# Patient Record
Sex: MALE | Race: WHITE | NOT HISPANIC OR LATINO
[De-identification: names, ages, dates, MRNs, and addresses within clinical notes are randomized per-mention and may not be internally consistent; named-entity substitution may affect disease eponyms.]

---

## 2023-05-23 ENCOUNTER — APPOINTMENT (OUTPATIENT)
Dept: ORTHOPEDIC SURGERY | Facility: CLINIC | Age: 63
End: 2023-05-23
Payer: COMMERCIAL

## 2023-05-23 VITALS — WEIGHT: 270 LBS | BODY MASS INDEX: 37.8 KG/M2 | HEIGHT: 71 IN

## 2023-05-23 DIAGNOSIS — Z78.9 OTHER SPECIFIED HEALTH STATUS: ICD-10-CM

## 2023-05-23 DIAGNOSIS — S83.241A OTHER TEAR OF MEDIAL MENISCUS, CURRENT INJURY, RIGHT KNEE, INITIAL ENCOUNTER: ICD-10-CM

## 2023-05-23 DIAGNOSIS — I10 ESSENTIAL (PRIMARY) HYPERTENSION: ICD-10-CM

## 2023-05-23 DIAGNOSIS — Z87.19 PERSONAL HISTORY OF OTHER DISEASES OF THE DIGESTIVE SYSTEM: ICD-10-CM

## 2023-05-23 PROBLEM — Z00.00 ENCOUNTER FOR PREVENTIVE HEALTH EXAMINATION: Status: ACTIVE | Noted: 2023-05-23

## 2023-05-23 PROCEDURE — 73564 X-RAY EXAM KNEE 4 OR MORE: CPT | Mod: 50

## 2023-05-23 PROCEDURE — 99204 OFFICE O/P NEW MOD 45 MIN: CPT

## 2023-05-23 RX ORDER — AMLODIPINE BESYLATE 10 MG/1
10 TABLET ORAL
Refills: 0 | Status: ACTIVE | COMMUNITY

## 2023-05-23 RX ORDER — HYDROCHLOROTHIAZIDE 12.5 MG/1
12.5 CAPSULE ORAL
Refills: 0 | Status: ACTIVE | COMMUNITY

## 2023-05-23 RX ORDER — IRBESARTAN 300 MG/1
300 TABLET ORAL
Refills: 0 | Status: ACTIVE | COMMUNITY

## 2023-05-23 NOTE — REASON FOR VISIT
[FreeTextEntry2] : here today for f/u left knee pain and some right knee pain.  was last seen in 1/2021. c/o 6 weeks ago had pop in knee and increased pain.  today is better but still feels pressure.  NKI,  couldn’t put pressure on knee for 3 weeks.

## 2023-05-23 NOTE — PHYSICAL EXAM
[Normal Coordination] : normal coordination [Normal DTR UE/LE] : normal DTR UE/LE  [Normal Sensation] : normal sensation [Normal Mood and Affect] : normal mood and affect [Orientated] : orientated [Normal Skin] : normal skin [No Rash] : no rash [No Ulcers] : no ulcers [No Lesions] : no lesions [No obvious lymphadenopathy in areas examined] : no obvious lymphadenopathy in areas examined [Negative] : negative Lydia's [NL (0)] : extension 0 degrees [5___] : hamstring 5[unfilled]/5 [Equivocal] : equivocal Florina [] : patient ambulates without assistive device [Right] : right knee [Left] : left knee [AP] : anteroposterior [Lateral] : lateral [[employer]] : skyline [FreeTextEntry9] : joint space narrowing. Sclerotic Bone VARUS Knee bone on bone MEDIAL . tri comp Osteophyte formation. No fractures seen  [TWNoteComboBox7] : flexion 120 degrees

## 2023-05-23 NOTE — DISCUSSION/SUMMARY
[de-identified] :  Lengthy discussion regarding options was had with the patient. Nonsurgical options including but not limited to cortisone,viscosupplementation, anti-inflammatory medications, activity modification,  non impact exercise /maintaining a healthy BMI, bracing, and icing were reviewed. Surgical options including but not limited to arthroscopy, and joint replacement were discussed as was risks, benefits and alternatives. All questions were answered. \par \par I spent time going in detail the problem and the associated risks/benefits of surgery. detailed complications but not limited to: of nerve injury, non union, repeat fx, dvt/pe postop, instability,transfusion, infection, NVA injury, stiffness, leg length discrepancy, inability to ambulate & death. discussed implant and model shown to patient. answered all patient questions. patient understands procedure and postop directions. \par \par Patient voiced some interest in a UTKA, however due to the tricompartmental DJD noted on xray a partial is contraindicated, this was discussed with patient. \par \par Left knee is both more painful and worse on x-ray\par \par Patient was also shown stair based HEP to improve ROM\par \par At this time the plan is to go forward with a CSI in the left knee before patient leaves for [place]\par \par f/u before trip for CSI

## 2023-08-16 ENCOUNTER — APPOINTMENT (OUTPATIENT)
Dept: ORTHOPEDIC SURGERY | Facility: CLINIC | Age: 63
End: 2023-08-16

## 2023-08-29 ENCOUNTER — APPOINTMENT (OUTPATIENT)
Dept: ORTHOPEDIC SURGERY | Facility: CLINIC | Age: 63
End: 2023-08-29
Payer: COMMERCIAL

## 2023-08-29 VITALS — WEIGHT: 280 LBS | BODY MASS INDEX: 39.2 KG/M2 | HEIGHT: 71 IN

## 2023-08-29 DIAGNOSIS — M17.12 UNILATERAL PRIMARY OSTEOARTHRITIS, LEFT KNEE: ICD-10-CM

## 2023-08-29 PROCEDURE — 20610 DRAIN/INJ JOINT/BURSA W/O US: CPT | Mod: LT

## 2023-08-29 PROCEDURE — 99213 OFFICE O/P EST LOW 20 MIN: CPT | Mod: 25

## 2023-08-29 NOTE — HISTORY OF PRESENT ILLNESS
[de-identified] : Today 8/29/23: here today for f/u left knee pain  CSI  before he goes to [place] .  was last seen in 5/2023.   5/2023: here today for f/u left knee pain and some right knee pain. was last seen in 1/2021. c/o 6 weeks ago had pop in knee and increased pain. today is better but still feels pressure. NKI, couldn't put pressure on knee for 3 weeks.

## 2023-08-29 NOTE — DISCUSSION/SUMMARY
[de-identified] :  Lengthy discussion regarding options was had with the patient. Nonsurgical options including but not limited to cortisone, viscosupplementation, anti-inflammatory medications, activity modification, non-impact exercise, maintaining a healthy BMI, bracing, and icing were reviewed. Surgical options including but not limited to arthroscopy, and joint replacement were discussed as was risks, benefits and alternatives. All questions were answered.  dic with pt going forward with CSI   f/u PRN

## 2023-08-29 NOTE — PHYSICAL EXAM
[Normal Coordination] : normal coordination [Normal DTR UE/LE] : normal DTR UE/LE  [Normal Sensation] : normal sensation [Normal Mood and Affect] : normal mood and affect [Orientated] : orientated [Normal Skin] : normal skin [No Rash] : no rash [No Ulcers] : no ulcers [No Lesions] : no lesions [No obvious lymphadenopathy in areas examined] : no obvious lymphadenopathy in areas examined [NL (0)] : extension 0 degrees [5___] : hamstring 5[unfilled]/5 [Equivocal] : equivocal Florina [Right] : right knee [AP] : anteroposterior [Lateral] : lateral [[place]] : skyline [Left] : left knee [All Views] : anteroposterior, lateral, skyline, and anteroposterior standing [] : non-antalgic [FreeTextEntry9] : joint space narrowing. Sclerotic Bone VARUS Knee bone on bone MEDIAL . tri comp Osteophyte formation. No fractures seen  [TWNoteComboBox7] : flexion 120 degrees [de-identified] : extension 0 degrees

## 2023-08-29 NOTE — PROCEDURE
[Large Joint Injection] : Large joint injection [Left] : of the left [Knee] : knee [Pain] : pain [Inflammation] : inflammation [X-ray evidence of Osteoarthritis on this or prior visit] : x-ray evidence of Osteoarthritis on this or prior visit [Alcohol] : alcohol [Betadine] : betadine [Ethyl Chloride sprayed topically] : ethyl chloride sprayed topically [___ cc    6mg] :  Betamethasone ([place]) ~Vcc of 6mg [___ cc    1%] : Lidocaine ~Vcc of 1%  [Call if redness, pain or fever occur] : call if redness, pain or fever occur [Apply ice for 15min out of every hour for the next 12-24 hours as tolerated] : apply ice for 15 minutes out of every hour for the next 12-24 hours as tolerated [Previous OTC use and PT nontherapeutic] : patient has tried OTC's including aspirin, Ibuprofen, Aleve, etc or prescription NSAIDS, and/or exercises at home and/or physical therapy without satisfactory response [Patient had decreased mobility in the joint] : patient had decreased mobility in the joint [Risks, benefits, alternatives discussed / Verbal consent obtained] : the risks benefits, and alternatives have been discussed, and verbal consent was obtained

## 2023-11-14 ENCOUNTER — APPOINTMENT (OUTPATIENT)
Dept: ORTHOPEDIC SURGERY | Facility: CLINIC | Age: 63
End: 2023-11-14
Payer: COMMERCIAL

## 2023-11-14 VITALS — HEIGHT: 71 IN | BODY MASS INDEX: 38.5 KG/M2 | WEIGHT: 275 LBS

## 2023-11-14 DIAGNOSIS — M17.11 UNILATERAL PRIMARY OSTEOARTHRITIS, RIGHT KNEE: ICD-10-CM

## 2023-11-14 DIAGNOSIS — S76.219A STRAIN OF ADDUCTOR MUSCLE, FASCIA AND TENDON OF UNSPECIFIED THIGH, INITIAL ENCOUNTER: ICD-10-CM

## 2023-11-14 PROCEDURE — 99213 OFFICE O/P EST LOW 20 MIN: CPT

## 2023-11-14 PROCEDURE — 73502 X-RAY EXAM HIP UNI 2-3 VIEWS: CPT

## 2024-01-13 ENCOUNTER — NON-APPOINTMENT (OUTPATIENT)
Age: 64
End: 2024-01-13

## 2024-01-23 ENCOUNTER — APPOINTMENT (OUTPATIENT)
Dept: ORTHOPEDIC SURGERY | Facility: CLINIC | Age: 64
End: 2024-01-23
Payer: COMMERCIAL

## 2024-01-23 VITALS
BODY MASS INDEX: 40.32 KG/M2 | WEIGHT: 288 LBS | HEART RATE: 78 BPM | DIASTOLIC BLOOD PRESSURE: 76 MMHG | SYSTOLIC BLOOD PRESSURE: 130 MMHG | HEIGHT: 71 IN | OXYGEN SATURATION: 99 %

## 2024-01-23 PROCEDURE — 99205 OFFICE O/P NEW HI 60 MIN: CPT

## 2024-01-23 PROCEDURE — 73502 X-RAY EXAM HIP UNI 2-3 VIEWS: CPT

## 2024-01-23 NOTE — PHYSICAL EXAM
[de-identified] : The patient appears well nourished and in no apparent distress. The patient is alert and oriented to person, place, and time. Affect and mood appear normal. The head is normocephalic and atraumatic.  The eyes reveal normal sclera and extra ocular muscles are intact. The tongue is midline with no apparent lesions.  Skin shows normal turgor with no evidence of eczema or psoriasis.  No respiratory distress noted.  Sensation grossly intact.		  [de-identified] : Exam of the right hip shows hip flexion of 80 degrees with groin pain, hip external rotation of 30 degrees, hip internal rotation of 10 degrees with pain. His right leg is 1 cm longer than his left.  5/5 motor strength bilaterally distally. Sensation intact distally.	  [de-identified] : X-ray: AP view of the pelvis and 2 views of the right hip demonstrate severe joint space narrowing.

## 2024-01-23 NOTE — DISCUSSION/SUMMARY
[de-identified] : ELODIA MILLS is a 63 year old male who presents with right hip severe osteoarthritis. The patient will work on weight loss as he ultimately hopes for anterior approach total hip replacement. He declines an order for an image guided intraarticular cortisone injection in his right hip for temporary pain relief.  He will follow up in Spring 2024 after he works on weight loss to further discuss total hip replacement surgery. total hip replacement surgery and the recovery from it were discussed in office today.		  The patient has exhausted a minimum of 3 months conservative treatment including prior injections, physical therapy, over the counter NSIADS and pain medication where indicated. In addition, the patient's quality of life is diminished due to significant chronic pain. The patient is having difficulty ambulating, descending stairs, and rising from a seated position.   Based upon the patient's continued symptoms and failure to respond to conservative treatment, I have recommended a right total hip replacement for this patient. A long discussion took place with the patient describing what a total joint replacement is and what the procedure would entail. A hip model, similar to the implants that will be used during the operation, was utilized to demonstrate the implants. Choices of implant manufactures were discussed and reviewed. The ability to secure the implant utilizing cement or cementless (press fit) fixation was discussed. Anterior and posterior exposures were discussed. For this patient an appropriate approach method will be determined at a later date. The patient agrees with the plan of care as well as the use of implants.   The hospitalization and post-operative care and rehabilitation were also discussed. The use of perioperative antibiotics and DVT prophylaxis were discussed. The risk, benefits and alternatives to a surgical intervention were discussed at length with the patient. The patient was also advised of risks related to the medical comorbidities and elevated body mass index (BMI). A lengthy discussion took place to review the most common complications including but not limited to: deep vein thrombosis, pulmonary embolus, heart attack, stroke, infection, wound breakdown, numbness, damage to nerves, tendon, muscles, arteries or other blood vessels, death and other possible complications from anesthesia. The patient was told that we will take steps to minimize these risks by using sterile technique, antibiotics and DVT prophylaxis when appropriate and follow the patient postoperatively in the office setting to monitor progress. The possibility of recurrent pain, no improvement in pain and actual worsening of pain were also discussed with the patient.   The discharge plan of care focused on the patient going home following surgery. The patient was encouraged to make the necessary arrangements to have someone stay with them when they are discharged home. Following discharge, a home care nurse will visit the patient. The home care nurse will open your home care case and request home physical therapy services. Home physical therapy will commence following discharge provided it is appropriate and covered by the health insurance benefit plan.   The benefits of surgery were discussed with the patient including the potential for improving the patient's current clinical condition through operative intervention. Alternatives to surgical intervention including continued conservative management were also discussed in detail. All questions were answered to the satisfaction of the patient. The treatment plan of care, as well as a model of a total hip equivalent to the one that will be used for their total joint replacement, was shared with the patient. The patient participated and agreed to the plan of care as well as the use of the recommended implants for their total hip replacement surgery.

## 2024-01-23 NOTE — ADDENDUM
[FreeTextEntry1] : This note was authored by Saul Conteh working as a medical scribe for Dr. Primo Cristina. The note was reviewed, edited, and revised by Dr. Primo Cristina whom is in agreement with the exam findings, imaging findings, and treatment plan. 01/23/2024

## 2024-01-23 NOTE — HISTORY OF PRESENT ILLNESS
[de-identified] : ELODIA MILLS is a 63 year old male who presents with right hip pain that started in September after he jumped off a diving board. His pain radiates down his leg up to his knee. He has tried physical therapy without adequate pain relief. He tried a cortisone injection in his right hip with only 3 days of pain relief. His pain makes it difficult to reciprocate stairs and he has trouble sleeping at night hayden to the pain. He presents today for initial evaluation of his right hip.

## 2024-03-26 ENCOUNTER — APPOINTMENT (OUTPATIENT)
Dept: ORTHOPEDIC SURGERY | Facility: CLINIC | Age: 64
End: 2024-03-26
Payer: COMMERCIAL

## 2024-03-26 VITALS — HEIGHT: 71 IN | BODY MASS INDEX: 37.8 KG/M2 | WEIGHT: 270 LBS

## 2024-03-26 PROCEDURE — 73502 X-RAY EXAM HIP UNI 2-3 VIEWS: CPT

## 2024-03-26 PROCEDURE — 99213 OFFICE O/P EST LOW 20 MIN: CPT

## 2024-03-26 NOTE — DATA REVIEWED
[MRI] : MRI [Pelvis] : pelvis [Report was reviewed and noted in the chart] : The report was reviewed and noted in the chart

## 2024-03-29 NOTE — REASON FOR VISIT
[FreeTextEntry2] : here for f/u right hip pain.  states after last visit in 11/23 he had hip injection at [facility] which only helped for 2 days.  he went to PT until end of january- no help. using celebrex for pain.  Groin and knee pain

## 2024-03-29 NOTE — DISCUSSION/SUMMARY
[de-identified] : New x rays show increased joint space narrowing as compared to previous films.  I spent time going in detail the problem and the associated risks/benefits of surgery. Went into detailed discussion of complications including but not limited to, nerve injury, non-union, repeat fracture, DVT /PE /pe postop, instability, transfusion, infection, NVA injury, stiffness, leg length discrepancy, inability to ambulate & death. Discussed implant and model shown to patient. Patient had ample time to ask any questions, and at this time answered all patient questions, should anymore arise they were instructed to follow up. Patient expressed understanding of the proposed procedure and postop directions. Patient has failed non-surgical treatment and PT is contraindicated at this time.

## 2024-03-29 NOTE — ASSESSMENT
[FreeTextEntry1] :  MRI Results- Moderate degenerative changes at the pubic symphysis. There is a partial tear of the right adductor origin along the inferior margin of the pubis and the torn fibers are slightly retracted distally. There is a low-grade intrasubstance tear at the left adductor origin. There is no surrounding edema or enhancement suggesting chronic injuries.  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 There is a moderate right femoral acetabular joint effusion.  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  Degenerative changes in the lumbar spine, not fully evaluated.

## 2024-03-29 NOTE — PHYSICAL EXAM
[5___] : abduction 5[unfilled]/5 [Right] : right knee [NL (0)] : extension 0 degrees [de-identified] : Flex 100 * IR 30 * EXT 50 * ABD 40* [FreeTextEntry8] : pain w/ flexion anterior laterally going to IT band [] : no extensor lag [FreeTextEntry9] : pain w/ flexion anterior laterally going to IT band [TWNoteComboBox7] : flexion 120 degrees

## 2024-04-02 ENCOUNTER — APPOINTMENT (OUTPATIENT)
Dept: ORTHOPEDIC SURGERY | Facility: CLINIC | Age: 64
End: 2024-04-02

## 2024-05-10 RX ORDER — MUPIROCIN 20 MG/G
2 OINTMENT TOPICAL
Qty: 1 | Refills: 0 | Status: ACTIVE | COMMUNITY
Start: 2024-05-10 | End: 1900-01-01

## 2024-05-16 ENCOUNTER — APPOINTMENT (OUTPATIENT)
Dept: ORTHOPEDIC SURGERY | Facility: AMBULATORY SURGERY CENTER | Age: 64
End: 2024-05-16
Payer: COMMERCIAL

## 2024-05-16 PROCEDURE — 27130 TOTAL HIP ARTHROPLASTY: CPT | Mod: RT

## 2024-05-16 RX ORDER — ACETAMINOPHEN EXTRA STRENGTH 500 MG/1
500 TABLET ORAL EVERY 8 HOURS
Qty: 180 | Refills: 0 | Status: ACTIVE | COMMUNITY
Start: 2024-05-16 | End: 1900-01-01

## 2024-05-16 RX ORDER — MELOXICAM 15 MG/1
15 TABLET ORAL
Qty: 30 | Refills: 0 | Status: ACTIVE | COMMUNITY
Start: 2024-05-16 | End: 1900-01-01

## 2024-05-16 RX ORDER — OXYCODONE 5 MG/1
5 TABLET ORAL
Qty: 42 | Refills: 0 | Status: ACTIVE | COMMUNITY
Start: 2024-05-16 | End: 1900-01-01

## 2024-05-16 RX ORDER — FAMOTIDINE 20 MG/1
20 TABLET, FILM COATED ORAL
Qty: 60 | Refills: 0 | Status: ACTIVE | COMMUNITY
Start: 2024-05-16 | End: 1900-01-01

## 2024-05-16 RX ORDER — CEPHALEXIN 500 MG/1
500 CAPSULE ORAL 4 TIMES DAILY
Qty: 4 | Refills: 0 | Status: ACTIVE | COMMUNITY
Start: 2024-05-16 | End: 1900-01-01

## 2024-05-16 RX ORDER — ASPIRIN 325 MG/1
325 TABLET, COATED ORAL
Qty: 60 | Refills: 0 | Status: ACTIVE | COMMUNITY
Start: 2024-05-16 | End: 1900-01-01

## 2024-05-31 ENCOUNTER — APPOINTMENT (OUTPATIENT)
Dept: ORTHOPEDIC SURGERY | Facility: CLINIC | Age: 64
End: 2024-05-31
Payer: COMMERCIAL

## 2024-05-31 DIAGNOSIS — M16.11 UNILATERAL PRIMARY OSTEOARTHRITIS, RIGHT HIP: ICD-10-CM

## 2024-05-31 PROCEDURE — 99024 POSTOP FOLLOW-UP VISIT: CPT

## 2024-05-31 NOTE — REASON FOR VISIT
[FreeTextEntry2] : here for f/u right GABRIEL 5/16/24.  using cane and wearing stockings.  using asa bid for anticoag.  is going to OPPT.  using no pain meds. states no pain.

## 2024-05-31 NOTE — DISCUSSION/SUMMARY
[de-identified] : Discussed importance of non-impact exercise and muscle stretching before and after exercise. Explained the importance of ice and rest.  Pt is doing well and is to continue with treatment plan. Pt was shown exercises and stretches that can help increase  ROM and strength.  Patient will begin OPPT at this time, given Rx for this.  F/u in 4 weeks

## 2024-06-20 ENCOUNTER — NON-APPOINTMENT (OUTPATIENT)
Age: 64
End: 2024-06-20

## 2024-06-27 ENCOUNTER — RESULT REVIEW (OUTPATIENT)
Age: 64
End: 2024-06-27

## 2024-06-27 ENCOUNTER — APPOINTMENT (OUTPATIENT)
Dept: ORTHOPEDIC SURGERY | Facility: CLINIC | Age: 64
End: 2024-06-27
Payer: COMMERCIAL

## 2024-06-27 VITALS — HEIGHT: 71 IN | BODY MASS INDEX: 36.4 KG/M2 | WEIGHT: 260 LBS

## 2024-06-27 DIAGNOSIS — Z96.641 PRESENCE OF RIGHT ARTIFICIAL HIP JOINT: ICD-10-CM

## 2024-06-27 PROCEDURE — 99024 POSTOP FOLLOW-UP VISIT: CPT

## 2024-07-02 ENCOUNTER — APPOINTMENT (OUTPATIENT)
Dept: ORTHOPEDIC SURGERY | Facility: CLINIC | Age: 64
End: 2024-07-02

## 2024-08-13 ENCOUNTER — APPOINTMENT (OUTPATIENT)
Dept: ORTHOPEDIC SURGERY | Facility: CLINIC | Age: 64
End: 2024-08-13
Payer: MEDICARE

## 2024-08-13 DIAGNOSIS — Z96.641 PRESENCE OF RIGHT ARTIFICIAL HIP JOINT: ICD-10-CM

## 2024-08-13 PROCEDURE — 99213 OFFICE O/P EST LOW 20 MIN: CPT

## 2024-08-13 PROCEDURE — 73502 X-RAY EXAM HIP UNI 2-3 VIEWS: CPT

## 2024-08-13 NOTE — REASON FOR VISIT
[FreeTextEntry2] : here for right GABRIEL 5/16/24.    Finished PT 7/31/2024    Doing  well  3 Months Post

## 2024-08-13 NOTE — DISCUSSION/SUMMARY
[de-identified] : Discussed importance of non-impact exercise and muscle stretching before and after exercise. Reviewed x-rays Explained the importance of ice and rest.  Stretching exercises shown, Explained the importance of Range of Motion before strength.  DISCUSSED NEED FOR PROPHYLACTIC ABX PRIOR TO ALL DENTAL WORK AND CLEANINGS FOR LIFE  Continue home strengthening and stretching program consisting of non-impact exercises and ice as needed. Return to office 9 months

## 2024-08-13 NOTE — PHYSICAL EXAM
[AP] : anteroposterior [Lateral] : lateral [Right] : right hip [] : no greater trochanteric tenderness [5___] : adduction 5[unfilled]/5 [FreeTextEntry8] : Gluteal region TTP [FreeTextEntry9] : Well-aligned, well-fixed prosthesis. No lucency noted.

## 2024-10-24 ENCOUNTER — APPOINTMENT (OUTPATIENT)
Dept: ORTHOPEDIC SURGERY | Facility: CLINIC | Age: 64
End: 2024-10-24
Payer: MEDICARE

## 2024-10-24 DIAGNOSIS — M17.11 UNILATERAL PRIMARY OSTEOARTHRITIS, RIGHT KNEE: ICD-10-CM

## 2024-10-24 DIAGNOSIS — M17.12 UNILATERAL PRIMARY OSTEOARTHRITIS, LEFT KNEE: ICD-10-CM

## 2024-10-24 DIAGNOSIS — M25.561 PAIN IN RIGHT KNEE: ICD-10-CM

## 2024-10-24 DIAGNOSIS — M25.562 PAIN IN RIGHT KNEE: ICD-10-CM

## 2024-10-24 DIAGNOSIS — G89.29 PAIN IN RIGHT KNEE: ICD-10-CM

## 2024-10-24 PROCEDURE — 99214 OFFICE O/P EST MOD 30 MIN: CPT

## 2024-10-24 PROCEDURE — 73564 X-RAY EXAM KNEE 4 OR MORE: CPT | Mod: 50

## 2024-11-06 ENCOUNTER — NON-APPOINTMENT (OUTPATIENT)
Age: 64
End: 2024-11-06

## 2025-01-01 ENCOUNTER — NON-APPOINTMENT (OUTPATIENT)
Age: 65
End: 2025-01-01

## 2025-01-10 RX ORDER — SEMAGLUTIDE 1.7 MG/.75ML
INJECTION, SOLUTION SUBCUTANEOUS
Refills: 0 | Status: ACTIVE | COMMUNITY

## 2025-01-10 RX ORDER — MUPIROCIN 20 MG/G
2 OINTMENT TOPICAL
Qty: 1 | Refills: 0 | Status: ACTIVE | COMMUNITY
Start: 2025-01-10 | End: 1900-01-01

## 2025-01-16 ENCOUNTER — APPOINTMENT (OUTPATIENT)
Dept: ORTHOPEDIC SURGERY | Facility: AMBULATORY SURGERY CENTER | Age: 65
End: 2025-01-16

## 2025-01-16 PROCEDURE — 27447 TOTAL KNEE ARTHROPLASTY: CPT | Mod: LT

## 2025-01-16 PROCEDURE — 20985 CPTR-ASST DIR MS PX: CPT

## 2025-01-16 PROCEDURE — 27447 TOTAL KNEE ARTHROPLASTY: CPT | Mod: AS,LT

## 2025-01-16 RX ORDER — FAMOTIDINE 20 MG/1
20 TABLET, FILM COATED ORAL
Qty: 60 | Refills: 0 | Status: ACTIVE | COMMUNITY
Start: 2025-01-16 | End: 1900-01-01

## 2025-01-16 RX ORDER — ACETAMINOPHEN EXTRA STRENGTH 500 MG/1
500 TABLET ORAL EVERY 8 HOURS
Qty: 180 | Refills: 0 | Status: ACTIVE | COMMUNITY
Start: 2025-01-16 | End: 1900-01-01

## 2025-01-16 RX ORDER — MELOXICAM 15 MG/1
15 TABLET ORAL
Qty: 30 | Refills: 0 | Status: ACTIVE | COMMUNITY
Start: 2025-01-16 | End: 1900-01-01

## 2025-01-16 RX ORDER — OXYCODONE 5 MG/1
5 TABLET ORAL
Qty: 42 | Refills: 0 | Status: ACTIVE | COMMUNITY
Start: 2025-01-16 | End: 1900-01-01

## 2025-01-16 RX ORDER — CEPHALEXIN 500 MG/1
500 CAPSULE ORAL 4 TIMES DAILY
Qty: 4 | Refills: 0 | Status: ACTIVE | COMMUNITY
Start: 2025-01-16 | End: 1900-01-01

## 2025-01-16 RX ORDER — ASPIRIN ENTERIC COATED TABLETS 81 MG 81 MG/1
81 TABLET, DELAYED RELEASE ORAL
Qty: 60 | Refills: 0 | Status: ACTIVE | COMMUNITY
Start: 2025-01-16 | End: 1900-01-01

## 2025-01-31 ENCOUNTER — APPOINTMENT (OUTPATIENT)
Dept: ORTHOPEDIC SURGERY | Facility: CLINIC | Age: 65
End: 2025-01-31
Payer: MEDICARE

## 2025-01-31 DIAGNOSIS — Z96.652 PRESENCE OF LEFT ARTIFICIAL KNEE JOINT: ICD-10-CM

## 2025-01-31 PROCEDURE — 99024 POSTOP FOLLOW-UP VISIT: CPT

## 2025-01-31 RX ORDER — OXYCODONE 5 MG/1
5 TABLET ORAL
Qty: 42 | Refills: 0 | Status: ACTIVE | COMMUNITY
Start: 2025-01-31 | End: 1900-01-01

## 2025-02-20 ENCOUNTER — NON-APPOINTMENT (OUTPATIENT)
Age: 65
End: 2025-02-20

## 2025-03-18 ENCOUNTER — APPOINTMENT (OUTPATIENT)
Dept: ORTHOPEDIC SURGERY | Facility: CLINIC | Age: 65
End: 2025-03-18
Payer: MEDICARE

## 2025-03-18 DIAGNOSIS — Z96.652 PRESENCE OF LEFT ARTIFICIAL KNEE JOINT: ICD-10-CM

## 2025-03-18 DIAGNOSIS — M17.12 UNILATERAL PRIMARY OSTEOARTHRITIS, LEFT KNEE: ICD-10-CM

## 2025-03-18 PROCEDURE — 73562 X-RAY EXAM OF KNEE 3: CPT | Mod: LT

## 2025-03-22 ENCOUNTER — NON-APPOINTMENT (OUTPATIENT)
Age: 65
End: 2025-03-22

## 2025-04-01 ENCOUNTER — NON-APPOINTMENT (OUTPATIENT)
Age: 65
End: 2025-04-01

## 2025-04-22 ENCOUNTER — APPOINTMENT (OUTPATIENT)
Dept: ORTHOPEDIC SURGERY | Facility: CLINIC | Age: 65
End: 2025-04-22
Payer: MEDICARE

## 2025-04-22 VITALS — WEIGHT: 274 LBS | HEIGHT: 71 IN | BODY MASS INDEX: 38.36 KG/M2

## 2025-04-22 DIAGNOSIS — Z96.652 PRESENCE OF LEFT ARTIFICIAL KNEE JOINT: ICD-10-CM

## 2025-04-22 PROCEDURE — 73562 X-RAY EXAM OF KNEE 3: CPT | Mod: LT

## 2025-04-22 PROCEDURE — 99213 OFFICE O/P EST LOW 20 MIN: CPT

## 2025-05-13 ENCOUNTER — APPOINTMENT (OUTPATIENT)
Dept: ORTHOPEDIC SURGERY | Facility: CLINIC | Age: 65
End: 2025-05-13
Payer: MEDICARE

## 2025-05-13 DIAGNOSIS — Z96.652 PRESENCE OF LEFT ARTIFICIAL KNEE JOINT: ICD-10-CM

## 2025-05-13 DIAGNOSIS — Z96.641 PRESENCE OF RIGHT ARTIFICIAL HIP JOINT: ICD-10-CM

## 2025-05-13 DIAGNOSIS — M17.12 UNILATERAL PRIMARY OSTEOARTHRITIS, LEFT KNEE: ICD-10-CM

## 2025-05-13 DIAGNOSIS — M16.11 UNILATERAL PRIMARY OSTEOARTHRITIS, RIGHT HIP: ICD-10-CM

## 2025-05-13 PROCEDURE — 73502 X-RAY EXAM HIP UNI 2-3 VIEWS: CPT

## 2025-05-13 PROCEDURE — 99213 OFFICE O/P EST LOW 20 MIN: CPT

## 2025-07-03 ENCOUNTER — NON-APPOINTMENT (OUTPATIENT)
Age: 65
End: 2025-07-03